# Patient Record
Sex: FEMALE | Race: WHITE | URBAN - METROPOLITAN AREA
[De-identification: names, ages, dates, MRNs, and addresses within clinical notes are randomized per-mention and may not be internally consistent; named-entity substitution may affect disease eponyms.]

---

## 2020-10-28 PROBLEM — Z86.39 HISTORY OF HYPERTHYROIDISM: Status: ACTIVE | Noted: 2020-10-28

## 2020-10-28 PROBLEM — M81.0 POSTMENOPAUSAL OSTEOPOROSIS: Status: ACTIVE | Noted: 2020-10-28

## 2020-10-28 PROBLEM — Z86.39 HISTORY OF GRAVES' DISEASE: Status: ACTIVE | Noted: 2020-10-28

## 2020-11-12 ENCOUNTER — HOSPITAL ENCOUNTER (OUTPATIENT)
Dept: INFUSION THERAPY | Age: 65
Discharge: HOME | End: 2020-11-12
Payer: OTHER GOVERNMENT

## 2020-11-12 ENCOUNTER — HOSPITAL ENCOUNTER (OUTPATIENT)
Dept: LAB | Age: 65
Discharge: HOME | End: 2020-11-12

## 2020-11-12 VITALS
HEART RATE: 69 BPM | BODY MASS INDEX: 25.06 KG/M2 | TEMPERATURE: 97.7 F | RESPIRATION RATE: 18 BRPM | SYSTOLIC BLOOD PRESSURE: 147 MMHG | DIASTOLIC BLOOD PRESSURE: 86 MMHG | OXYGEN SATURATION: 99 % | WEIGHT: 162.4 LBS

## 2020-11-12 LAB
CALCIUM SERPL-MCNC: 9.8 MG/DL (ref 8.3–10.4)
CREAT SERPL-MCNC: 0.9 MG/DL (ref 0.6–1)

## 2020-11-12 PROCEDURE — 96374 THER/PROPH/DIAG INJ IV PUSH: CPT

## 2020-11-12 PROCEDURE — 82310 ASSAY OF CALCIUM: CPT

## 2020-11-12 PROCEDURE — 74011250636 HC RX REV CODE- 250/636: Performed by: INTERNAL MEDICINE

## 2020-11-12 PROCEDURE — 82565 ASSAY OF CREATININE: CPT

## 2020-11-12 PROCEDURE — 36415 COLL VENOUS BLD VENIPUNCTURE: CPT

## 2020-11-12 RX ORDER — SODIUM CHLORIDE 0.9 % (FLUSH) 0.9 %
10-30 SYRINGE (ML) INJECTION AS NEEDED
Status: DISCONTINUED | OUTPATIENT
Start: 2020-11-12 | End: 2020-11-14 | Stop reason: HOSPADM

## 2020-11-12 RX ORDER — ZOLEDRONIC ACID 5 MG/100ML
5 INJECTION, SOLUTION INTRAVENOUS ONCE
Status: COMPLETED | OUTPATIENT
Start: 2020-11-12 | End: 2020-11-12

## 2020-11-12 RX ADMIN — ZOLEDRONIC ACID 5 MG: 0.05 INJECTION, SOLUTION INTRAVENOUS at 10:40

## 2020-11-12 RX ADMIN — Medication 10 ML: at 10:05

## 2020-11-12 RX ADMIN — Medication 10 ML: at 10:55

## 2020-11-12 NOTE — PROGRESS NOTES
Arrived to the [facility]. Reclast completed.    Provided education on Reclast-first infusion  Patient instructed to report any side affects to ordering provider-Dr.Kyle Hidalgo  Patient tolerated well  Any issues or concerns during appointment:No  Patient has no future appointments in OPI @ this time  Discharged home ambulatory

## 2022-02-11 ENCOUNTER — HOSPITAL ENCOUNTER (OUTPATIENT)
Dept: INFUSION THERAPY | Age: 67
Discharge: HOME | End: 2022-02-11
Payer: OTHER GOVERNMENT

## 2022-02-11 ENCOUNTER — HOSPITAL ENCOUNTER (OUTPATIENT)
Dept: LAB | Age: 67
Discharge: HOME | End: 2022-02-11

## 2022-02-11 VITALS
RESPIRATION RATE: 16 BRPM | WEIGHT: 165.6 LBS | TEMPERATURE: 98.3 F | HEART RATE: 75 BPM | BODY MASS INDEX: 25.55 KG/M2 | OXYGEN SATURATION: 96 %

## 2022-02-11 LAB
CALCIUM SERPL-MCNC: 9.2 MG/DL (ref 8.3–10.4)
CREAT SERPL-MCNC: 0.9 MG/DL (ref 0.6–1)

## 2022-02-11 PROCEDURE — 36415 COLL VENOUS BLD VENIPUNCTURE: CPT

## 2022-02-11 PROCEDURE — 74011250636 HC RX REV CODE- 250/636: Performed by: INTERNAL MEDICINE

## 2022-02-11 PROCEDURE — 82310 ASSAY OF CALCIUM: CPT

## 2022-02-11 PROCEDURE — 96374 THER/PROPH/DIAG INJ IV PUSH: CPT

## 2022-02-11 PROCEDURE — 82565 ASSAY OF CREATININE: CPT

## 2022-02-11 RX ORDER — ZOLEDRONIC ACID 5 MG/100ML
5 INJECTION, SOLUTION INTRAVENOUS ONCE
Status: COMPLETED | OUTPATIENT
Start: 2022-02-11 | End: 2022-02-11

## 2022-02-11 RX ADMIN — ZOLEDRONIC ACID 5 MG: 0.05 INJECTION, SOLUTION INTRAVENOUS at 14:38

## 2022-02-11 NOTE — PROGRESS NOTES
Arrived to the [facility]. Reclast completed and tolerated well. Provided education on Reclast  Patient instructed to report any side affects to ordering MD  Any issues or concerns during appointment:No  Patient has no future appointments at this time. Discharged ambulatory with self.

## 2022-03-19 PROBLEM — M81.0 POSTMENOPAUSAL OSTEOPOROSIS: Status: ACTIVE | Noted: 2020-10-28

## 2022-03-19 PROBLEM — Z86.39 HISTORY OF HYPERTHYROIDISM: Status: ACTIVE | Noted: 2020-10-28

## 2022-03-19 PROBLEM — Z86.39 HISTORY OF GRAVES' DISEASE: Status: ACTIVE | Noted: 2020-10-28

## 2022-06-02 DIAGNOSIS — E05.00 GRAVES' DISEASE: ICD-10-CM

## 2022-06-02 DIAGNOSIS — E05.90 HYPERTHYROIDISM: ICD-10-CM

## 2022-06-02 DIAGNOSIS — E55.9 VITAMIN D DEFICIENCY: Primary | ICD-10-CM

## 2023-01-31 LAB
25(OH)D3+25(OH)D2 SERPL-MCNC: 63.9 NG/ML (ref 30–100)
ALBUMIN SERPL-MCNC: 4.6 G/DL (ref 3.8–4.8)
ALBUMIN/GLOB SERPL: 2.1 {RATIO} (ref 1.2–2.2)
ALP SERPL-CCNC: 74 IU/L (ref 44–121)
ALT SERPL-CCNC: 23 IU/L (ref 0–32)
AST SERPL-CCNC: 28 IU/L (ref 0–40)
BILIRUB SERPL-MCNC: 0.4 MG/DL (ref 0–1.2)
BUN SERPL-MCNC: 13 MG/DL (ref 8–27)
BUN/CREAT SERPL: 18 (ref 12–28)
CALCIUM SERPL-MCNC: 9.8 MG/DL (ref 8.7–10.3)
CHLORIDE SERPL-SCNC: 99 MMOL/L (ref 96–106)
CO2 SERPL-SCNC: 27 MMOL/L (ref 20–29)
CREAT SERPL-MCNC: 0.71 MG/DL (ref 0.57–1)
EGFR: 94 ML/MIN/1.73
GLOBULIN SER CALC-MCNC: 2.2 G/DL (ref 1.5–4.5)
GLUCOSE SERPL-MCNC: 83 MG/DL (ref 70–99)
POTASSIUM SERPL-SCNC: 4.3 MMOL/L (ref 3.5–5.2)
PROT SERPL-MCNC: 6.8 G/DL (ref 6–8.5)
SODIUM SERPL-SCNC: 141 MMOL/L (ref 134–144)
SPECIMEN STATUS REPORT: NORMAL
T3 SERPL-MCNC: 106 NG/DL (ref 71–180)
T4 FREE SERPL-MCNC: 1.21 NG/DL (ref 0.82–1.77)
TSH SERPL DL<=0.005 MIU/L-ACNC: 0.51 UIU/ML (ref 0.45–4.5)

## 2023-02-06 ENCOUNTER — TELEMEDICINE (OUTPATIENT)
Dept: ENDOCRINOLOGY | Age: 68
End: 2023-02-06

## 2023-02-06 DIAGNOSIS — E55.9 VITAMIN D DEFICIENCY: ICD-10-CM

## 2023-02-06 DIAGNOSIS — M81.0 AGE-RELATED OSTEOPOROSIS WITHOUT CURRENT PATHOLOGICAL FRACTURE: Primary | ICD-10-CM

## 2023-02-06 DIAGNOSIS — Z86.39 HISTORY OF GRAVES' DISEASE: ICD-10-CM

## 2023-02-06 DIAGNOSIS — Z86.39 HISTORY OF HYPERTHYROIDISM: ICD-10-CM

## 2023-02-06 PROCEDURE — 1123F ACP DISCUSS/DSCN MKR DOCD: CPT | Performed by: INTERNAL MEDICINE

## 2023-02-06 PROCEDURE — 99214 OFFICE O/P EST MOD 30 MIN: CPT | Performed by: INTERNAL MEDICINE

## 2023-02-06 ASSESSMENT — ENCOUNTER SYMPTOMS
DIARRHEA: 0
CONSTIPATION: 0

## 2023-02-06 NOTE — PROGRESS NOTES
Shreri Isbell MD, [address]            Reason for visit: Follow-up of osteoporosis and vitamin D deficiency    I was in the office while conducting this encounter. Consent:  Blair Lopez, who was evaluated through a synchronous (real-time) audio-video encounter, and/or her healthcare decision maker, is aware that it is a billable service, which includes applicable co-pays, with coverage as determined by her insurance carrier. She provided verbal consent to proceed and patient identification was verified. This visit was conducted pursuant to the emergency declaration under the [facility], [address] [place] [address] waiver authority and the [employer] and [employer] Act. A caregiver was present when appropriate. Ability to conduct physical exam was limited. The patient was located at home in a state where the provider was licensed to provide care. This virtual visit was conducted via [address] Pursuant to the emergency declaration under the [facility], [facility] authority and the [employer] and [employer] Act, this Virtual  Visit was conducted to reduce the patient's risk of exposure to COVID-19 and provide continuity of care for an established patient. Services were provided through a video synchronous discussion virtually to substitute for in-person clinic visit. Due to this being a TeleHealth evaluation, many elements of the physical examination are unable to be assessed. --Ava Castillo MD on 2/6/2023 at 9:44 AM        ASSESSMENT AND PLAN:    1. Age-related osteoporosis without current pathological fracture  I will update bone densitometry. She has now received 2 doses of Reclast.  She would like to avoid further treatment if possible. Return in 1 year with repeat labs.   - DEXA BONE DENSITY AXIAL SKELETON; Future  - Comprehensive Metabolic Panel; Future  - Vitamin D 25 Hydroxy; Future    2. Vitamin D deficiency  Vitamin D is replete. - Vitamin D 25 Hydroxy; Future    3. History of hyperthyroidism  She remains biochemically euthyroid on no treatment.  - TSH; Future  - T4, Free; Future    4. History of Graves' disease      Follow-up and Dispositions    Return in about 1 year (around 2/6/2024). History of Present Illness:    Bruno Wadsowrth presents to the office for follow up of hyperthyroidism and osteoporosis. HYPERTHYROIDISM  Prior studies:   5/20/2008: Iodine 123 uptake and scan [facility]/ Dr. Randy Pizano)- 24 hour uptake 12.6%. Heterogeneous tracer distribution. Prior treatment: She was treated with methimazole from ~8720-9696. Symptoms: See review of systems below     Labs:  8/27/1997: TSH 0.1, free T4 1.20, T3 112.  9/1/1998: TSH 0.2, free T4 1.22, T3 102.  2/13/2002: TSH 0.36.  1/6/2004: TSH 0.437.  4/25/2008: TSH 0.123.  5/22/2008: TSH 0.278, free T4 1.20, T3 154, AM cortisol 18.  7/28/2008: TSH 0.756.  10/28/2008: TSH 0.791, free T4 1. 12.  1/27/2009: TSH 0.984, free T4 0.85.  8/5/2009: TSH 0.617, free T4 1.18.  11/4/2009: TSH 1.147.  2/5/2010: TSH 0.820.  11/12/2010: TSH 0.31, free T4 1.2  11/30/2012: TSH 0.360, free T4 1.0.   6/4/2014: TSH 0.360, free T4 1.1, T3 86.  9/9/2015: TSH 0.576, free T4 1.19, T3 127.  9/20/2016: TSH 0.908, free T4 1.35, T3 81.  3/7/2019: TSH 0.496, free T4 1.32, T3 102.  10/12/2020: TSH 0.403, free T4 1.25, T3 105.  1/21/2022: TSH 0.582, free T4 1.22, T3 103.         METABOLIC BONE DISEASE  DXA:   6/3/2008 (Dr. Otto Pina)- Right hip BMD 0.732/T-score -1.7/Z-score -1.2, right femoral neck BMD 0.592/T-score -2.3/Z-score -1.5, left hip BMD 0.766/T-score -1.4/Z-score -1.0, left femoral neck BMD 0.608/T-score -2.2/Z-score -1.4, L1-4 BMD 1.182/T-score 1.2/Z-score 2.0.      7/2/2014 ([facility] [address])- L2-3 BMD not provided/T-score 2.4, right femoral neck BMD not provided/T score -2.6, left femoral neck BMD not provided/T score -2.2.     7/13/2016 ([address])- L1-4 BMD not provided/T-score 1.7, right femoral neck BMD not provided/T-score -2.2, left femoral neck BMD not provided/T-score -2.9.     8/14/2018 ([address])- L1-4 BMD 1.300/T-score 0.9, right femoral neck BMD 0.644/T-score -2.8, right hip BMD 0.671/T-score -2.7, left femoral neck BMD 0.690/T-score -2.5, left hip BMD 0.709/T-score -2.4.     9/21/2020 [facility])- L1-4 BMD 1.289/T-score 0.8, right femoral neck BMD 0.612/T-score -3.1, right hip BMD 0.670/T-score -2.7, left femoral neck BMD 0.676/T-score -2.6, left hip BMD 0.692/T-score -2.5. Labs:  6/4/2014: Calcium 9.7, creatinine 0.80, 25-hydroxy vitamin D 35.3.  9/9/2015: Calcium 9.3, 25-hydroxy vitamin D 36.4.  9/20/2016: Calcium 9.6, creatinine 0.80, 25-hydroxy vitamin D 42.8.  3/7/2019: Calcium 9.3, creatinine 0.77, 25-hydroxy vitamin D 39.6.  10/12/2020: Calcium 9.8, creatinine 0.89, 25-hydroxy vitamin D 56.8.  10/12/2020: Calcium 9.8, creatinine 0.90.  11/19/2021: Calcium 9.3, creatinine 0.75, 25-hydroxy vitamin D 43.1.  1/21/2022: Calcium 9.3, creatinine 0.88, 25-hydroxy vitamin D 46.0     Prior treatment: She took Fosamax briefly (no more than 6 months) in 2008. Currently taking calcium (dose unknown) and vitamin D3 2000 units daily. She received Reclast 11/12/2020 and 2/11/2022. Fracture history: none       HYPERTHYROIDISM  Prior studies:   5/20/2008: Iodine 123 uptake and scan [facility]/ Dr. Gisel Elam)- 24 hour uptake 12.6%. Heterogeneous tracer distribution. Prior treatment: She was treated with methimazole from ~9573-0993.       Symptoms: See review of systems below     Labs:  8/27/1997: TSH 0.1, free T4 1.20, T3 112.  9/1/1998: TSH 0.2, free T4 1.22, T3 102.  2/13/2002: TSH 0.36.  1/6/2004: TSH 0.437.  4/25/2008: TSH 0.123.  5/22/2008: TSH 0.278, free T4 1.20, T3 154, AM cortisol 18.  7/28/2008: TSH 0.756.  10/28/2008: TSH 0.791, free T4 1. 12.  1/27/2009: TSH 0.984, free T4 0.85.  8/5/2009: TSH 0.617, free T4 1.18.  11/4/2009: TSH 1.147.  2/5/2010: TSH 0.820.  11/12/2010: TSH 0.31, free T4 1.2  11/30/2012: TSH 0.360, free T4 1.0.   6/4/2014: TSH 0.360, free T4 1.1, T3 86.  9/9/2015: TSH 0.576, free T4 1.19, T3 127.  9/20/2016: TSH 0.908, free T4 1.35, T3 81.  3/7/2019: TSH 0.496, free T4 1.32, T3 102.  10/12/2020: TSH 0.403, free T4 1.25, T3 105.  1/21/2022: TSH 0.582, free T4 1.22, T3 103.  1/30/2023: TSH 0.505, free T4 1.21, T3 106. METABOLIC BONE DISEASE  DXA:   6/3/2008 (Dr. Cezar Hale)- Right hip BMD 0.732/T-score -1.7/Z-score -1.2, right femoral neck BMD 0.592/T-score -2.3/Z-score -1.5, left hip BMD 0.766/T-score -1.4/Z-score -1.0, left femoral neck BMD 0.608/T-score -2.2/Z-score -1.4, L1-4 BMD 1.182/T-score 1.2/Z-score 2.0.      7/2/2014 ([address])- L2-3 BMD not provided/T-score 2.4, right femoral neck BMD not provided/T score -2.6, left femoral neck BMD not provided/T score -2.2.     7/13/2016 [facility])- L1-4 BMD not provided/T-score 1.7, right femoral neck BMD not provided/T-score -2.2, left femoral neck BMD not provided/T-score -2.9.     8/14/2018 ([address])- L1-4 BMD 1.300/T-score 0.9, right femoral neck BMD 0.644/T-score -2.8, right hip BMD 0.671/T-score -2.7, left femoral neck BMD 0.690/T-score -2.5, left hip BMD 0.709/T-score -2.4.     9/21/2020 [facility])- L1-4 BMD 1.289/T-score 0.8, right femoral neck BMD 0.612/T-score -3.1, right hip BMD 0.670/T-score -2.7, left femoral neck BMD 0.676/T-score -2.6, left hip BMD 0.692/T-score -2.5.      Labs:  6/4/2014: Calcium 9.7, creatinine 0.80, 25-hydroxy vitamin D 35.3.  9/9/2015: Calcium 9.3, 25-hydroxy vitamin D 36.4.  9/20/2016: Calcium 9.6, creatinine 0.80, 25-hydroxy vitamin D 42.8.  3/7/2019: Calcium 9.3, creatinine 0.77, 25-hydroxy vitamin D 39.6.  10/12/2020: Calcium 9.8, creatinine 0.89, 25-hydroxy vitamin D 56.8.  10/12/2020: Calcium 9.8, creatinine 0.90.  11/19/2021: Calcium 9.3, creatinine 0.75, 25-hydroxy vitamin D 43.1.  1/21/2022: Calcium 9.3, creatinine 0.88, 25-hydroxy vitamin D 46.0.  2/11/2022: Calcium 9.2, creatinine 0.90.  1/30/2023: Calcium 9.8, creatinine 0.71, 25-hydroxy vitamin D 63.9. Prior treatment: She took Fosamax briefly (no more than 6 months) in 2008. Currently taking calcium (dose unknown) and vitamin D3 2000 units daily. She received Reclast 11/12/2020 and 2/11/2022. Fracture history: none    Review of Systems   Constitutional:  Positive for unexpected weight change (gained 5-10 pounds in the last year). Negative for fatigue. Cardiovascular:  Negative for palpitations. Gastrointestinal:  Negative for constipation and diarrhea. Psychiatric/Behavioral:  The patient is nervous/anxious. There were no vitals taken for this visit. Wt Readings from Last 3 Encounters:   No data found for Wt       Physical Exam  Constitutional:       Appearance: Normal appearance. HENT:      Head: Normocephalic. Nose: Nose normal.   Eyes:      Extraocular Movements: Extraocular movements intact. Pulmonary:      Effort: Pulmonary effort is normal.   Musculoskeletal:         General: Normal range of motion. Right shoulder: Normal.      Left shoulder: Normal.      Cervical back: Normal range of motion. Skin:     Coloration: Skin is not jaundiced or pale. Neurological:      General: No focal deficit present. Mental Status: She is alert. Mental status is at baseline.    Psychiatric:         Mood and Affect: Mood normal.         Behavior: Behavior normal.           Orders Placed This Encounter   Procedures    DEXA BONE DENSITY AXIAL SKELETON     Standing Status:   Future     Standing Expiration Date:   2/6/2024     Scheduling Instructions: Schedule at [facility]    Comprehensive Metabolic Panel     Standing Status:   Future     Standing Expiration Date:   2/6/2025    Vitamin D 25 Hydroxy     Standing Status:   Future     Standing Expiration Date:   2/6/2025    TSH     Standing Status:   Future     Standing Expiration Date:   2/6/2025    T4, Free     Standing Status:   Future     Standing Expiration Date:   2/6/2025         Current Outpatient Medications   Medication Sig Dispense Refill    ascorbic acid (VITAMIN C) 1000 MG tablet Take 1,000 mg by mouth      calcium carbonate (OYSTER SHELL CALCIUM 500 MG) 1250 (500 Ca) MG tablet Take by mouth daily      vitamin D (CHOLECALCIFEROL) 25 MCG (1000 UT) TABS tablet Take 2,000 Units by mouth daily      Zinc Sulfate 66 MG TABS Take by mouth      azelastine (ASTELIN) 0.1 % nasal spray USE 2 SPRAY(S) IN EACH NOSTRIL TWICE DAILY AS DIRECTED      triamcinolone (KENALOG) 0.1 % cream Apply by topical route 2 times every day a thin layer to the affected area(s) for 1 week and as needed       No current facility-administered medications for this visit. Saúl Haney MD, FACE      Portions of this note were generated with the assistance of voice recognition software. As such, some errors in transcription may be present.

## 2023-02-21 DIAGNOSIS — M81.0 AGE-RELATED OSTEOPOROSIS WITHOUT CURRENT PATHOLOGICAL FRACTURE: ICD-10-CM

## 2024-02-02 LAB
25(OH)D3+25(OH)D2 SERPL-MCNC: 38.8 NG/ML (ref 30–100)
ALBUMIN SERPL-MCNC: 4.6 G/DL (ref 3.9–4.9)
ALBUMIN/GLOB SERPL: 2.1 {RATIO} (ref 1.2–2.2)
ALP SERPL-CCNC: 69 IU/L (ref 44–121)
ALT SERPL-CCNC: 25 IU/L (ref 0–32)
AST SERPL-CCNC: 30 IU/L (ref 0–40)
BILIRUB SERPL-MCNC: 0.3 MG/DL (ref 0–1.2)
BUN SERPL-MCNC: 16 MG/DL (ref 8–27)
BUN/CREAT SERPL: 18 (ref 12–28)
CALCIUM SERPL-MCNC: 8.6 MG/DL (ref 8.7–10.3)
CHLORIDE SERPL-SCNC: 100 MMOL/L (ref 96–106)
CO2 SERPL-SCNC: 26 MMOL/L (ref 20–29)
CREAT SERPL-MCNC: 0.87 MG/DL (ref 0.57–1)
EGFRCR SERPLBLD CKD-EPI 2021: 73 ML/MIN/1.73
GLOBULIN SER CALC-MCNC: 2.2 G/DL (ref 1.5–4.5)
GLUCOSE SERPL-MCNC: 77 MG/DL (ref 70–99)
POTASSIUM SERPL-SCNC: 4.6 MMOL/L (ref 3.5–5.2)
PROT SERPL-MCNC: 6.8 G/DL (ref 6–8.5)
SODIUM SERPL-SCNC: 139 MMOL/L (ref 134–144)
SPECIMEN STATUS REPORT: NORMAL
T4 FREE SERPL-MCNC: 1.29 NG/DL (ref 0.82–1.77)
TSH SERPL DL<=0.005 MIU/L-ACNC: 0.9 UIU/ML (ref 0.45–4.5)

## 2024-02-05 ENCOUNTER — TELEMEDICINE (OUTPATIENT)
Dept: ENDOCRINOLOGY | Age: 69
End: 2024-02-05
Payer: MEDICARE

## 2024-02-05 DIAGNOSIS — Z86.39 HISTORY OF GRAVES' DISEASE: ICD-10-CM

## 2024-02-05 DIAGNOSIS — E55.9 VITAMIN D DEFICIENCY: ICD-10-CM

## 2024-02-05 DIAGNOSIS — Z86.39 HISTORY OF HYPERTHYROIDISM: ICD-10-CM

## 2024-02-05 DIAGNOSIS — M81.0 AGE-RELATED OSTEOPOROSIS WITHOUT CURRENT PATHOLOGICAL FRACTURE: Primary | ICD-10-CM

## 2024-02-05 PROCEDURE — G8484 FLU IMMUNIZE NO ADMIN: HCPCS | Performed by: INTERNAL MEDICINE

## 2024-02-05 PROCEDURE — 1090F PRES/ABSN URINE INCON ASSESS: CPT | Performed by: INTERNAL MEDICINE

## 2024-02-05 PROCEDURE — G2211 COMPLEX E/M VISIT ADD ON: HCPCS | Performed by: INTERNAL MEDICINE

## 2024-02-05 PROCEDURE — G8421 BMI NOT CALCULATED: HCPCS | Performed by: INTERNAL MEDICINE

## 2024-02-05 PROCEDURE — G8427 DOCREV CUR MEDS BY ELIG CLIN: HCPCS | Performed by: INTERNAL MEDICINE

## 2024-02-05 PROCEDURE — 4004F PT TOBACCO SCREEN RCVD TLK: CPT | Performed by: INTERNAL MEDICINE

## 2024-02-05 PROCEDURE — 99214 OFFICE O/P EST MOD 30 MIN: CPT | Performed by: INTERNAL MEDICINE

## 2024-02-05 PROCEDURE — 3017F COLORECTAL CA SCREEN DOC REV: CPT | Performed by: INTERNAL MEDICINE

## 2024-02-05 PROCEDURE — 1123F ACP DISCUSS/DSCN MKR DOCD: CPT | Performed by: INTERNAL MEDICINE

## 2024-02-05 PROCEDURE — G8399 PT W/DXA RESULTS DOCUMENT: HCPCS | Performed by: INTERNAL MEDICINE

## 2024-02-05 ASSESSMENT — ENCOUNTER SYMPTOMS
DIARRHEA: 0
CONSTIPATION: 0

## 2024-02-05 NOTE — PROGRESS NOTES
KIRTI Clemons MD, FACE    [facility] ENDOCRINOLOGY   AND   THYROID NODULE CLINIC            Reason for visit: Follow-up of osteoporosis and vitamin D deficiency    I was in the office while conducting this encounter.    Consent:  Irma Sifuentes, who was evaluated through a synchronous (real-time) audio-video encounter, and/or her healthcare decision maker, is aware that it is a billable service, which includes applicable co-pays, with coverage as determined by her insurance carrier. She provided verbal consent to proceed and patient identification was verified. This visit was conducted pursuant to the emergency declaration under the [employer] Act and the National Emergencies Act, 1135 waiver authority and the Coronavirus Preparedness and Response Supplemental Appropriations Act. A caregiver was present when appropriate. Ability to conduct physical exam was limited. The patient was located at home in a state where the provider was licensed to provide care.     This virtual visit was conducted via [employer]. Pursuant to the emergency declaration under the [employer] Act and the National Emergencies Act, 1135 waiver authority and the Coronavirus Preparedness and Response Supplemental Appropriations Act, this Virtual  Visit was conducted to reduce the patient's risk of exposure to COVID-19 and provide continuity of care for an established patient. Services were provided through a video synchronous discussion virtually to substitute for in-person clinic visit.  Due to this being a TeleHealth evaluation, many elements of the physical examination are unable to be assessed.     --Michael Clemons MD on 2/5/2024 at 9:35 AM        ASSESSMENT AND PLAN:    1. Age-related osteoporosis without current pathological fracture  She has now received 2 doses of Reclast.  She would like to avoid further treatment if possible.  Her most recent bone densitometry demonstrated minor improvement in bone density.  I will update bone

## 2025-02-05 DIAGNOSIS — E55.9 VITAMIN D DEFICIENCY: ICD-10-CM

## 2025-02-05 DIAGNOSIS — M81.0 AGE-RELATED OSTEOPOROSIS WITHOUT CURRENT PATHOLOGICAL FRACTURE: ICD-10-CM

## 2025-02-05 DIAGNOSIS — Z86.39 HISTORY OF HYPERTHYROIDISM: ICD-10-CM

## 2025-02-26 LAB
25(OH)D3+25(OH)D2 SERPL-MCNC: 34.7 NG/ML (ref 30–100)
T4 FREE SERPL-MCNC: 1.21 NG/DL (ref 0.82–1.77)
TSH SERPL DL<=0.005 MIU/L-ACNC: 0.66 UIU/ML (ref 0.45–4.5)

## 2025-02-27 LAB
ALBUMIN SERPL-MCNC: 4 G/DL (ref 3.9–4.9)
ALP SERPL-CCNC: 87 IU/L (ref 44–121)
ALT SERPL-CCNC: 14 IU/L (ref 0–32)
AST SERPL-CCNC: 21 IU/L (ref 0–40)
BILIRUB SERPL-MCNC: 0.3 MG/DL (ref 0–1.2)
BUN SERPL-MCNC: 13 MG/DL (ref 8–27)
BUN/CREAT SERPL: 17 (ref 12–28)
CALCIUM SERPL-MCNC: 9.3 MG/DL (ref 8.7–10.3)
CHLORIDE SERPL-SCNC: 104 MMOL/L (ref 96–106)
CO2 SERPL-SCNC: 21 MMOL/L (ref 20–29)
CREAT SERPL-MCNC: 0.76 MG/DL (ref 0.57–1)
EGFRCR SERPLBLD CKD-EPI 2021: 86 ML/MIN/1.73
GLOBULIN SER CALC-MCNC: 2.6 G/DL (ref 1.5–4.5)
GLUCOSE SERPL-MCNC: 79 MG/DL (ref 70–99)
POTASSIUM SERPL-SCNC: 4.8 MMOL/L (ref 3.5–5.2)
PROT SERPL-MCNC: 6.6 G/DL (ref 6–8.5)
SODIUM SERPL-SCNC: 140 MMOL/L (ref 134–144)

## 2025-03-10 ENCOUNTER — TELEMEDICINE (OUTPATIENT)
Dept: ENDOCRINOLOGY | Age: 70
End: 2025-03-10
Payer: MEDICARE

## 2025-03-10 DIAGNOSIS — M81.0 AGE-RELATED OSTEOPOROSIS WITHOUT CURRENT PATHOLOGICAL FRACTURE: Primary | ICD-10-CM

## 2025-03-10 DIAGNOSIS — Z86.39 HISTORY OF GRAVES' DISEASE: ICD-10-CM

## 2025-03-10 DIAGNOSIS — E55.9 VITAMIN D DEFICIENCY: ICD-10-CM

## 2025-03-10 DIAGNOSIS — Z86.39 HISTORY OF HYPERTHYROIDISM: ICD-10-CM

## 2025-03-10 PROCEDURE — G8399 PT W/DXA RESULTS DOCUMENT: HCPCS | Performed by: INTERNAL MEDICINE

## 2025-03-10 PROCEDURE — 1090F PRES/ABSN URINE INCON ASSESS: CPT | Performed by: INTERNAL MEDICINE

## 2025-03-10 PROCEDURE — 3017F COLORECTAL CA SCREEN DOC REV: CPT | Performed by: INTERNAL MEDICINE

## 2025-03-10 PROCEDURE — 4004F PT TOBACCO SCREEN RCVD TLK: CPT | Performed by: INTERNAL MEDICINE

## 2025-03-10 PROCEDURE — G8421 BMI NOT CALCULATED: HCPCS | Performed by: INTERNAL MEDICINE

## 2025-03-10 PROCEDURE — 99214 OFFICE O/P EST MOD 30 MIN: CPT | Performed by: INTERNAL MEDICINE

## 2025-03-10 PROCEDURE — 1159F MED LIST DOCD IN RCRD: CPT | Performed by: INTERNAL MEDICINE

## 2025-03-10 PROCEDURE — 1123F ACP DISCUSS/DSCN MKR DOCD: CPT | Performed by: INTERNAL MEDICINE

## 2025-03-10 PROCEDURE — G8427 DOCREV CUR MEDS BY ELIG CLIN: HCPCS | Performed by: INTERNAL MEDICINE

## 2025-03-10 PROCEDURE — G2211 COMPLEX E/M VISIT ADD ON: HCPCS | Performed by: INTERNAL MEDICINE

## 2025-03-10 ASSESSMENT — ENCOUNTER SYMPTOMS
CONSTIPATION: 0
COUGH: 1
DIARRHEA: 0

## 2025-03-10 NOTE — PROGRESS NOTES
KIRTI Clemons MD, FACE    [facility] ENDOCRINOLOGY   AND   THYROID NODULE CLINIC            Reason for visit: Follow-up of osteoporosis and vitamin D deficiency    Irma Sifuentes, was evaluated through a synchronous (real-time) audio-video encounter. The patient (or guardian if applicable) is aware that this is a billable service, which includes applicable co-pays. This Virtual Visit was conducted with patient's (and/or legal guardian's) consent. Patient identification was verified, and a caregiver was present when appropriate.   The patient was located at Home: [address] [place], [place] [address]e [facility] [address]  Provider was located at Facility (Appt Dept): [address] [place] [address]  Confirm you are appropriately licensed, registered, or certified to deliver care in the state where the patient is located as indicated above. If you are not or unsure, please re-schedule the visit: Yes, I confirm.        --Michael Clemons MD on 3/10/2025 at 9:04 AM    An electronic signature was used to authenticate this note.      ASSESSMENT AND PLAN:    1. Age-related osteoporosis without current pathological fracture  She has now received 2 doses of Reclast.  She would like to avoid further treatment if possible.  Her most recent bone densitometry demonstrated minor improvement in bone density.  I will update bone densitometry now/at her convenience.   Return in 1 year with repeat labs.  I will plan to provide her with longitudinal care for this problem.  - DEXA BONE DENSITY AXIAL SKELETON; Future  - Comprehensive Metabolic Panel; Future  - Vitamin D 25 Hydroxy; Future    2. Vitamin D deficiency  Vitamin D is replete.  - Vitamin D 25 Hydroxy; Future    3. History of hyperthyroidism  She remains biochemically euthyroid on no treatment.  - TSH; Future  - T4, Free; Future    4. History of Graves' disease      Follow-up and Dispositions    Return in about 1 year (around 3/10/2026).             History of Present

## 2025-03-28 ENCOUNTER — PATIENT MESSAGE (OUTPATIENT)
Dept: ENDOCRINOLOGY | Age: 70
End: 2025-03-28